# Patient Record
Sex: FEMALE | Race: WHITE | NOT HISPANIC OR LATINO | Employment: OTHER | URBAN - METROPOLITAN AREA
[De-identification: names, ages, dates, MRNs, and addresses within clinical notes are randomized per-mention and may not be internally consistent; named-entity substitution may affect disease eponyms.]

---

## 2023-12-28 ENCOUNTER — HOSPITAL ENCOUNTER (EMERGENCY)
Facility: HOSPITAL | Age: 73
Discharge: HOME | End: 2023-12-28
Attending: STUDENT IN AN ORGANIZED HEALTH CARE EDUCATION/TRAINING PROGRAM
Payer: MEDICARE

## 2023-12-28 VITALS
DIASTOLIC BLOOD PRESSURE: 74 MMHG | TEMPERATURE: 96.8 F | HEIGHT: 64 IN | BODY MASS INDEX: 26.12 KG/M2 | SYSTOLIC BLOOD PRESSURE: 138 MMHG | RESPIRATION RATE: 16 BRPM | WEIGHT: 153 LBS | HEART RATE: 70 BPM | OXYGEN SATURATION: 98 %

## 2023-12-28 DIAGNOSIS — G51.0 BELL'S PALSY: Primary | ICD-10-CM

## 2023-12-28 LAB — GLUCOSE BLD MANUAL STRIP-MCNC: 194 MG/DL (ref 74–99)

## 2023-12-28 PROCEDURE — 82947 ASSAY GLUCOSE BLOOD QUANT: CPT

## 2023-12-28 PROCEDURE — 86618 LYME DISEASE ANTIBODY: CPT | Performed by: STUDENT IN AN ORGANIZED HEALTH CARE EDUCATION/TRAINING PROGRAM

## 2023-12-28 PROCEDURE — 36415 COLL VENOUS BLD VENIPUNCTURE: CPT | Performed by: STUDENT IN AN ORGANIZED HEALTH CARE EDUCATION/TRAINING PROGRAM

## 2023-12-28 PROCEDURE — 99283 EMERGENCY DEPT VISIT LOW MDM: CPT | Performed by: STUDENT IN AN ORGANIZED HEALTH CARE EDUCATION/TRAINING PROGRAM

## 2023-12-28 RX ORDER — PREDNISONE 20 MG/1
60 TABLET ORAL DAILY
Qty: 15 TABLET | Refills: 0 | Status: SHIPPED | OUTPATIENT
Start: 2023-12-28 | End: 2024-01-02

## 2023-12-28 ASSESSMENT — COLUMBIA-SUICIDE SEVERITY RATING SCALE - C-SSRS
6. HAVE YOU EVER DONE ANYTHING, STARTED TO DO ANYTHING, OR PREPARED TO DO ANYTHING TO END YOUR LIFE?: NO
1. IN THE PAST MONTH, HAVE YOU WISHED YOU WERE DEAD OR WISHED YOU COULD GO TO SLEEP AND NOT WAKE UP?: NO
2. HAVE YOU ACTUALLY HAD ANY THOUGHTS OF KILLING YOURSELF?: NO

## 2023-12-28 ASSESSMENT — PAIN SCALES - GENERAL
PAINLEVEL_OUTOF10: 0 - NO PAIN
PAINLEVEL_OUTOF10: 0 - NO PAIN

## 2023-12-28 ASSESSMENT — PAIN - FUNCTIONAL ASSESSMENT: PAIN_FUNCTIONAL_ASSESSMENT: 0-10

## 2023-12-29 NOTE — DISCHARGE INSTRUCTIONS
As we discussed in the emergency department, we think your symptoms are secondary to Bell's palsy.  We will start you on steroids for this.  You do not need antiviral medication.  We will give you artificial tears for your eye.  While you are away, please put these artificial tears in your eye every 3-6 hours as needed.  When you go to sleep, we would suggest taping your eyelid shut.  You are given information to follow-up with neurology.  Please return to the emergency department if you have any other further concerns.

## 2023-12-29 NOTE — ED PROVIDER NOTES
HPI   Chief Complaint   Patient presents with    Facial Droop     72 Y/O FEMALE STATES THAT SHE NOTICED SHE WAS HAVING DIFFICULTY DRINKING LAST NIGHT AND THIS MORNING SHE NOTICED LEFT SIDED FACILA DROOP WHILE BRUSHING HER TEETH. NO OTHER NEURO DEFICITS NOTED IN TRIAGE. PATIENT STATES SHE HAS HISTORY OF BELLS PALSY.  KORYALERT CALLED AND THEN CANCELLED BY MD.        Reported history of Bell's palsy presents with left-sided facial droop noted this morning.  Patient states she was noticing difficulty drinking last evening; it sounds though she was having difficulty keeping fluid in her mouth.  States that she noticed a facial droop this morning and the mirror.  She notes associated dry sensation of the left eye.  When I asked her about her taste, she does note that her tongue feels numb, particularly to the front, left aspect of her tongue.  Denies falls and head trauma.  Denies headache, vision changes, neck pain, blurry vision, difficulty talking, difficulty swallowing, and focal numbness/weakness.  Does [place], but has not recently been in the woods has not noted any rashes on her skin.  She denies a history of CVA.      History provided by:  Patient   used: No                        [place] Coma Scale Score: 15         NIH Stroke Scale: 1          Patient History   No past medical history on file.  No past surgical history on file.  No family history on file.  Social History     Tobacco Use    Smoking status: Not on file    Smokeless tobacco: Not on file   Substance Use Topics    Alcohol use: Not on file    Drug use: Not on file       Physical Exam   ED Triage Vitals [12/28/23 1944]   Temp Heart Rate Resp BP   36 °C (96.8 °F) 75 16 155/72      SpO2 Temp Source Heart Rate Source Patient Position   99 % Temporal Monitor Sitting      BP Location FiO2 (%)     Right arm --       Physical Exam  Vitals and nursing note reviewed.   HENT:      Head: Atraumatic.       Right Ear: Tympanic membrane, ear canal and external ear normal.      Left Ear: Tympanic membrane, ear canal and external ear normal.      Ears:      Comments: No vesicular lesions in the ear.     Mouth/Throat:      Mouth: Mucous membranes are moist.   Eyes:      Extraocular Movements: Extraocular movements intact.      Conjunctiva/sclera: Conjunctivae normal.      Pupils: Pupils are equal, round, and reactive to light.   Cardiovascular:      Rate and Rhythm: Normal rate and regular rhythm.   Pulmonary:      Effort: Pulmonary effort is normal.   Abdominal:      Palpations: Abdomen is soft.      Tenderness: There is no abdominal tenderness.   Musculoskeletal:         General: No deformity.      Cervical back: Normal range of motion.   Skin:     General: Skin is warm and dry.   Neurological:      Mental Status: She is alert.      Comments: There is a mild left-sided facial droop is more apparent when the patient smiles.  There is very mild involvement of the left forehead.  There is very incomplete closure of the left eye, but patient can easily close her left eye with additional effort.  She is mentating appropriately.  Cranial nerves II through XII are otherwise grossly intact.  She has no truncal ataxia.  No limb ataxia finger-nose-finger or heel-to-shin.  Sensation intact to light touch in all extremities.  Ambulates without assistance.         ED Course & MDM   Diagnoses as of 12/29/23 0034   Bell's palsy       Medical Decision Making  Patient has a history of hypertension diet-controlled diabetes with a distant history of Bell's palsy.  Per chart review, she has a history of HOCM, hypertension, type 2 diabetes, CKD stage III, parkinsonism, type 2 diabetes, GERD, cocaine use, depression, and right eye drooping status post MRI brain 7/16/2023 and was without acute findings.  Her clinical picture is consistent with a moderate dysfunction Bell's palsy.  Stroke alert was initially called from triage; after my initial  evaluation, this was canceled, particularly given her clinical picture suggestive is Bell's palsy as well as her symptoms being present for greater than 24 hours.  Given her past medical history via chart review, she does have CVA risk factors and we discussed how this is being considered, but seems less likely than Bell's palsy.  Shared decision making to send a Lyme titer, given [place], patient does not want any further workup in the emergency department this time, which think is very reasonable, given her current clinical picture.  Shared decision making to trial a course of steroids and artificial tears.  We did discuss eye care.  Shared decision making not to trial antivirals at this time.  She was given a neurology referral for follow-up.  Discussed return precautions.    Amount and/or Complexity of Data Reviewed  Labs: ordered.    Risk  Prescription drug management.        Procedure  Procedures     Eligio Farley MD  12/29/23 0034

## 2024-01-01 LAB — B BURGDOR.VLSE1+PEPC10 AB SER IA-ACNC: 0.31 IV

## 2024-05-15 ENCOUNTER — OFFICE VISIT (OUTPATIENT)
Dept: NEUROLOGY | Facility: HOSPITAL | Age: 74
End: 2024-05-15
Payer: MEDICARE

## 2024-05-15 VITALS
HEIGHT: 65 IN | HEART RATE: 85 BPM | RESPIRATION RATE: 18 BRPM | DIASTOLIC BLOOD PRESSURE: 82 MMHG | BODY MASS INDEX: 25.49 KG/M2 | SYSTOLIC BLOOD PRESSURE: 145 MMHG | WEIGHT: 153 LBS | TEMPERATURE: 95.7 F

## 2024-05-15 DIAGNOSIS — G25.0 ESSENTIAL TREMOR: Primary | ICD-10-CM

## 2024-05-15 DIAGNOSIS — G51.0 BELL'S PALSY: ICD-10-CM

## 2024-05-15 PROCEDURE — 1160F RVW MEDS BY RX/DR IN RCRD: CPT

## 2024-05-15 PROCEDURE — 1159F MED LIST DOCD IN RCRD: CPT

## 2024-05-15 PROCEDURE — 99205 OFFICE O/P NEW HI 60 MIN: CPT

## 2024-05-15 PROCEDURE — 99215 OFFICE O/P EST HI 40 MIN: CPT | Mod: GC

## 2024-05-15 PROCEDURE — 1126F AMNT PAIN NOTED NONE PRSNT: CPT

## 2024-05-15 RX ORDER — SIMVASTATIN 20 MG/1
20 TABLET, FILM COATED ORAL NIGHTLY
COMMUNITY

## 2024-05-15 RX ORDER — AMLODIPINE BESYLATE 10 MG/1
10 TABLET ORAL DAILY
COMMUNITY

## 2024-05-15 RX ORDER — ATOMOXETINE 40 MG/1
40 CAPSULE ORAL DAILY
COMMUNITY

## 2024-05-15 RX ORDER — METFORMIN HYDROCHLORIDE 500 MG/1
500 TABLET ORAL
COMMUNITY

## 2024-05-15 RX ORDER — TROSPIUM CHLORIDE 20 MG/1
20 TABLET, FILM COATED ORAL DAILY
COMMUNITY

## 2024-05-15 RX ORDER — HYDROXYZINE HYDROCHLORIDE 10 MG/1
10 TABLET, FILM COATED ORAL ONCE
COMMUNITY

## 2024-05-15 RX ORDER — LOSARTAN POTASSIUM 50 MG/1
100 TABLET ORAL DAILY
COMMUNITY

## 2024-05-15 RX ORDER — METOPROLOL TARTRATE 50 MG/1
50 TABLET ORAL 2 TIMES DAILY
COMMUNITY

## 2024-05-15 ASSESSMENT — PAIN SCALES - GENERAL: PAINLEVEL: 0-NO PAIN

## 2024-05-15 NOTE — PROGRESS NOTES
Subjective     Gabby Cruz is a 73 y.o. year old female with history of HTN, ADD, sleep apnea on CPAP, diabetes, GERD, hypertension, past cocaine use (sober for 40 years per patient)   Presenting with the followin) [place] palsy for the 3rd time  2) Tremors  3) Head  bobbing    HPI:   [place] Palsy:   - First had palsy 2.5 years ago on the right side - took a few weeks to clear up, has another episode of right sided bells palsy a years ago. Not been positive for HSV or lyme in the past.   - A few months ago had bells palsy on the left side - lasted only a few weeks. Said she had some vision abnormalities during her first episode.     Tremors: Left hand is the worst- notices it when trying to do things with that hand and uses right hand to stabilize herself. Notices that its worse in the morning when she wakes up and in the evening when she is tired. Has had it for 7-8 years.   Gets occasional  head bobbing about once a month - has almost always been in the evening when she is tired.   Father and maternal grandmother also had tremors. Father was diagnosed with essential tremors.    Denies any numbness, weakness, has noticed new bladder incontinence in the last 3 months (describes urinary urgency - is started on medications). Does not report acting out dreams.     No family hx of neurological disorders - MS or parkinson's.     Review of Systems    PMH:  GERD  Diabetic  Heart murmur  ADD    PSH:  Bladder sling 23 yo    Current Outpatient Medications   Medication Instructions    amLODIPine (NORVASC) 10 mg, oral, Daily    atomoxetine (STRATTERA) 40 mg, oral, Daily, Swallow capsule whole; do not open. If opened accidentally, do not touch eyes; wash hands immediately (product is an eye irritant).    empagliflozin (JARDIANCE ORAL) 40 mg, oral, Daily    hydrOXYzine HCL (ATARAX) 10 mg, oral, Once    losartan (COZAAR) 100 mg, oral, Daily    lubricating eye drops ophthalmic solution 1 drop, Left Eye, 3 times daily PRN  "   metFORMIN (GLUCOPHAGE) 500 mg, oral, 2 times daily (morning and late afternoon)    metoprolol tartrate (LOPRESSOR) 50 mg, oral, 2 times daily    simvastatin (ZOCOR) 20 mg, oral, Nightly    trospium (SANCTURA) 20 mg, oral, Daily    -     Social History     Tobacco Use    Smoking status: Never    Smokeless tobacco: Never   Substance Use Topics    Alcohol use: Not on file     family history is not on file.    Allergies   Allergen Reactions    Erythromycin Rash    Penicillins Rash       Objective   Visit Vitals  /82   Pulse 85   Temp 35.4 °C (95.7 °F)   Resp 18   Ht 1.638 m (5' 4.5\")   Wt 69.4 kg (153 lb)   LMP  (LMP Unknown)   BMI 25.86 kg/m²   OB Status Postmenopausal   Smoking Status Never   BSA 1.78 m²      Neurological Exam  Physical Exam  MENTAL STATE:  Orientation was normal to time, place and person. Recent and remote memory was intact.  Attention span and concentration were normal. Language testing was normal for comprehension, repetition, expression, and naming.     CRANIAL NERVES:  Cranial nerves were normal.      CN 2-  visual fields full to confrontation.      CN 3, 4, 6-  Pupils round, 4 mm in diameter, equally reactive to light. No ptosis. EOMs normal alignment, full range of movement, no nystagmus, no SAUNDRA     CN 5- Facial sensation intact bilaterally. Normal corneal reflexes.      CN 7- Normal and symmetric facial strength. Nasolabial folds symmetric.     CN 8- Hearing intact to finger rub, whisper.      CN 9- Palate elevates symmetrically.      CN 11- Normal strength of shoulder shrug and neck turning      CN 12- Tongue midline, with normal bulk and strength; no fasciculations.     MOTOR:  Motor exam was normal. Muscle bulk and tone were normal in both upper and lower extremities. Muscle strength was 5/5 in distal and proximal muscles in both upper and lower extremities. No fasciculations, tremor or other abnormal movements were present.   Some slowness in L and R finger tapping    REFLEXES:  " Right/ Left:  Biceps 3/3, brachioradialis 2/2, triceps 3/3 patellar 3/3, ankle 2/2    SENSORY: Sensory exam was intact to light touch,    COORDINATION: TERRY were intact in upper and lower extremities.  In UE- finger-nose-finger was intact and in LE- heel-to-shin was intact without dysmetria or overshoot.    Low amplitude, high frequency tremor notable in both arms L>R    GAIT: Station was stable with a normal base and negative Romberg sign. Some reduced R. Arm arm swing speed . Some instability with tandem gait.        IMAGING:   DATE OF EXAM: Jul 27 2023 10:03AM      [facility]   [id number]  -  MRI BRAIN WO/W IVCON  / ACCESSION #  [id number]     PROCEDURE REASON: multiple diagnoses          * * * * Physician Interpretation * * * *      EXAMINATION:  MRI BRAIN WO/W IVCON     HISTORY:  Stroke-like symptoms - Transient cerebral ischemia, unspecified   type     TECHNIQUE: MRI brain routine protocol without and with contrast.   M: [id number]     MR Contrast:  Dotarem   Contrast Dose:  13 cc   Route of Administration:  IV     COMPARISON:  CT brain 10/01/2020     RESULT:     Acute Change:  No evidence of restricted diffusion to suggest an acute   infarct.     Hemorrhage: No evidence of prior parenchymal hemorrhage on the   susceptibility weighted sequences.     Mass Lesion/ Mass Effect:  No evidence of an intracranial mass, abnormal   enhancement, extra-axial fluid collection, or significant localized mass   effect.     Chronic Change:  Small/patchy and early confluent nonspecific foci of   T2/FLAIR hyperintensity scattered throughout the supratentorial white   matter likely reflecting chronic microvascular ischemia.     Parenchyma:  No significant parenchymal volume loss for age.     Ventricles:  Normal caliber and morphology.     Skull Base:  Hypothalamic and pituitary region are grossly normal.   Craniocervical junction is normal. No significant marrow replacement   process.     Vasculature: Major intracranial arteries and dural  venous sinuses   demonstrate typical flow voids, suggesting patency by spin echo criteria.     Other:  The paranasal sinuses and mastoid air cells are clear.  The   orbits and extracranial soft tissues are unremarkable.     Assessment/Plan   72yo F with  history of HTN, ADD, sleep apnea on CPAP, diabetes, GERD, hypertension, past cocaine use (sober for 40 years per patient).   #Essential tremor  Exam significant for a low amplitude high frequency tremor c/f essential tremor. Also noted were reduced arm swing speed on right side with slowness in finger tapping. Will continue to monitor tremor for progression. Will treat as essential tremor at this time as most likely diagnosis given exam and family history.  #Recurrent [place] palsy  - 3 episodes in last 3 years - involving right and left side, resolving afte a few weeks. no residual deficits on exam. Lyme titers negative. Will review MRI Brain w.wo contrast from [facility] for demyelinating/inflammatory etiologies. Report is non concerning.     Plan:   - Review [facility] MRI Brain w.wo contrast  - Discuss with PCP about switching home metoprolol to propranolol to help control tremors  - Will order OT for weighted utensils.   - RTC in 3mo    Katrin Bosch MD  Neurology PGY-2    Patient seen and evaluated by DR. Corona    Addendum 5/16:   Had a telephone conversation with patients PCP Dr. Martinez at [facility] who agreed with switching metoprolol to propranolol. Patient called and informed about change.     Katrin Bosch

## 2024-05-15 NOTE — PATIENT INSTRUCTIONS
Dear Ms. Cruz,     Thank you for your visit today. You came in for recurrent bells palsy as well as tremor in your arms.   For your bells palsy - we would like to review your brain imaging from [facility] -we will call you when the imaging is in    For your tremors, we would like to change your metoprolol medication (for your heart) to propranolol to help with the tremors (we will discuss with your cardiologist to confirm if we can do this change). We also order occupational therapy to help you get weighted utensils that will also aid in carrying out your daily activities.     Best regards,    Neurology

## 2024-05-15 NOTE — Clinical Note
May 15, 2024     Eligio Farley MD  [address]  [address]  [place] [address]    Patient: Gabby Cruz   YOB: 1950   Date of Visit: 5/15/2024       Dear Dr. Eligio Farley MD:    Thank you for referring Gabby Cruz to me for evaluation. Below are my notes for this consultation.  If you have questions, please do not hesitate to call me. I look forward to following your patient along with you.       Sincerely,     Katrin Bosch MD      CC: No Recipients  ______________________________________________________________________________________    Subjective    Gabby Cruz is a 73 y.o. year old female with history of ADD, diabetes, GERD, hypertension, depression, narcolepsy, obesity, past cocaine use (sober for 40 years per patient)     HPI    Review of Systems    PMH:    PSH:  Social History     Tobacco Use    Smoking status: Not on file    Smokeless tobacco: Not on file   Substance Use Topics    Alcohol use: Not on file     family history is not on file.    Current Outpatient Medications:     lubricating eye drops ophthalmic solution, Administer 1 drop into the left eye 3 times a day as needed for dry eyes (3 times per day while awake)., Disp: 1 each, Rfl: 0  Allergies   Allergen Reactions    Erythromycin Rash    Penicillins Rash       Objective  Neurological Exam  Physical Exam  GENERAL APPEARANCE:  No distress, alert and cooperative.     CARDIOVASCULAR: Regular, rate and rhythm, without murmur. No carotid bruits. Pulses +2 and equal in all extremities. No edema, or tenderness to palpation.    MENTAL STATE:  Orientation was normal to time, place and person. Recent and remote memory was intact.  Attention span and concentration were normal. Language testing was normal for comprehension, repetition, expression, and naming. Calculation was intact. The patient could correctly interpret a picture, and copy a diagram. General fund of knowledge was intact. Mini-mental status examination  was performed with no errors.     OPHTHALMOSCOPIC: The ophthalmoscopic exam normal. The fundi were well visualized with normal disc margins, clear vessels and vascular pulsations. No disc edema. The cup/disk ratio was not enlarged. No hemorrhages or exudates were present in the posterior segments that were visualized.     CRANIAL NERVES:  Cranial nerves were normal.      CN 2- Visual Acuity  OD: 20/20 (corrected)   OS: 20/20 (corrected); visual fields full to confrontation.      CN 3, 4, 6-  Pupils round, 4 mm in diameter, equally reactive to light. No ptosis. EOMs normal alignment, full range of movement, no nystagmus     CN 5- Facial sensation intact bilaterally. Normal corneal reflexes.      CN 7- Normal and symmetric facial strength. Nasolabial folds symmetric.     CN 8- Hearing intact to finger rub, whisper.      CN 9- Palate elevates symmetrically. Normal gag reflex.      CN 11- Normal strength of shoulder shrug and neck turning      CN 12- Tongue midline, with normal bulk and strength; no fasciculations.     MOTOR:  Motor exam was normal. Muscle bulk and tone were normal in both upper and lower extremities. Muscle strength was 5/5 in distal and proximal muscles in both upper and lower extremities. No fasciculations, tremor or other abnormal movements were present.     REFLEXES:  Right/ Left:  Biceps 2/2, brachioradialis 2/2, triceps 2/2, patellar 2/2, ankle 2/2  Babinski: toes downgoing to plantar stimulation. No clonus, frontal release signs or other pathologic reflexes present.     SENSORY: Sensory exam was intact to light touch, sharp/dull, vibration and position sense in both UE and LE.     COORDINATION: TERRY were intact in upper and lower extremities.  In UE- finger-nose-finger was intact and in LE- heel-to-shin was intact without dysmetria or overshoot.      GAIT: Station was stable with a normal base and negative Romberg sign. Gait was stable with a normal arm swing and speed. No ataxia, shuffling,  steppage or waddling was noted. Tandem gait was intact. Postural reflexes were normal.     Assessment/Plan  {Assess/PlanSmartLinks:92019}

## 2024-05-16 RX ORDER — PROPRANOLOL HYDROCHLORIDE 40 MG/1
40 TABLET ORAL 2 TIMES DAILY
Qty: 60 TABLET | Refills: 11 | Status: SHIPPED | OUTPATIENT
Start: 2024-05-16 | End: 2025-05-16

## 2024-05-23 ENCOUNTER — EVALUATION (OUTPATIENT)
Dept: OCCUPATIONAL THERAPY | Facility: CLINIC | Age: 74
End: 2024-05-23
Payer: MEDICARE

## 2024-05-23 DIAGNOSIS — G25.0 ESSENTIAL TREMOR: ICD-10-CM

## 2024-05-23 PROCEDURE — 97165 OT EVAL LOW COMPLEX 30 MIN: CPT | Mod: GO

## 2024-05-23 PROCEDURE — 97110 THERAPEUTIC EXERCISES: CPT | Mod: GO

## 2024-05-23 ASSESSMENT — PATIENT HEALTH QUESTIONNAIRE - PHQ9
10. IF YOU CHECKED OFF ANY PROBLEMS, HOW DIFFICULT HAVE THESE PROBLEMS MADE IT FOR YOU TO DO YOUR WORK, TAKE CARE OF THINGS AT HOME, OR GET ALONG WITH OTHER PEOPLE: VERY DIFFICULT
1. LITTLE INTEREST OR PLEASURE IN DOING THINGS: SEVERAL DAYS
2. FEELING DOWN, DEPRESSED OR HOPELESS: SEVERAL DAYS
SUM OF ALL RESPONSES TO PHQ9 QUESTIONS 1 AND 2: 2

## 2024-05-23 ASSESSMENT — PAIN - FUNCTIONAL ASSESSMENT: PAIN_FUNCTIONAL_ASSESSMENT: 0-10

## 2024-05-23 ASSESSMENT — PAIN SCALES - GENERAL: PAINLEVEL_OUTOF10: 0 - NO PAIN

## 2024-09-11 ENCOUNTER — OFFICE VISIT (OUTPATIENT)
Dept: NEUROLOGY | Facility: HOSPITAL | Age: 74
End: 2024-09-11
Payer: MEDICARE

## 2024-09-11 VITALS
TEMPERATURE: 97.7 F | HEIGHT: 64 IN | DIASTOLIC BLOOD PRESSURE: 98 MMHG | RESPIRATION RATE: 17 BRPM | SYSTOLIC BLOOD PRESSURE: 147 MMHG | WEIGHT: 149.91 LBS | BODY MASS INDEX: 25.59 KG/M2 | HEART RATE: 85 BPM

## 2024-09-11 DIAGNOSIS — G25.0 ESSENTIAL TREMOR: Primary | ICD-10-CM

## 2024-09-11 DIAGNOSIS — G51.0 BELL'S PALSY: ICD-10-CM

## 2024-09-11 PROCEDURE — 99214 OFFICE O/P EST MOD 30 MIN: CPT | Performed by: STUDENT IN AN ORGANIZED HEALTH CARE EDUCATION/TRAINING PROGRAM

## 2024-09-11 PROCEDURE — 1126F AMNT PAIN NOTED NONE PRSNT: CPT | Performed by: STUDENT IN AN ORGANIZED HEALTH CARE EDUCATION/TRAINING PROGRAM

## 2024-09-11 PROCEDURE — 1159F MED LIST DOCD IN RCRD: CPT | Performed by: STUDENT IN AN ORGANIZED HEALTH CARE EDUCATION/TRAINING PROGRAM

## 2024-09-11 PROCEDURE — 99214 OFFICE O/P EST MOD 30 MIN: CPT | Mod: GC | Performed by: STUDENT IN AN ORGANIZED HEALTH CARE EDUCATION/TRAINING PROGRAM

## 2024-09-11 PROCEDURE — 3008F BODY MASS INDEX DOCD: CPT | Performed by: STUDENT IN AN ORGANIZED HEALTH CARE EDUCATION/TRAINING PROGRAM

## 2024-09-11 ASSESSMENT — PAIN SCALES - GENERAL: PAINLEVEL: 0-NO PAIN

## 2024-09-11 NOTE — PROGRESS NOTES
Subjective     Gabby Cruz is a 73 y.o. year old female here for follow up for tremors and repeated episodes of [place] palsy.     Last seen in May, 2014,   At that time, we started Ms. Cruz on propranolol from metoprolol for her essential tremors.   Patient also came for repeated evaluations for bells palsy, we had requested imaging from [facility] to review the contrasted images. Report from [facility] reports no enhancement.     Interval History:   Patient reports a 80% improvement in her tremors and head bobbing. She mainly notices the tremors first thing in the morning before she takes any pils. No side effects from medications. Is currently taking 60mg propranolol BID and is going to go to cardiology appointment for adjustment of her dosage.     Reports occasional constipation, some memory changes, no smell changes. No trouble walking or falls. Says about a year ago she has been Walking more slowly.       Review of Systems    There is no problem list on file for this patient.    No past medical history on file.  No past surgical history on file.  Social History     Tobacco Use    Smoking status: Never    Smokeless tobacco: Never   Substance Use Topics    Alcohol use: Not on file     family history is not on file.    Current Outpatient Medications:     amLODIPine (Norvasc) 10 mg tablet, Take 1 tablet (10 mg) by mouth once daily., Disp: , Rfl:     atomoxetine (Strattera) 40 mg capsule, Take 1 capsule (40 mg) by mouth once daily. Swallow capsule whole; do not open. If opened accidentally, do not touch eyes; wash hands immediately (product is an eye irritant)., Disp: , Rfl:     hydrOXYzine HCL (Atarax) 10 mg tablet, Take 1 tablet (10 mg) by mouth 1 time., Disp: , Rfl:     metFORMIN (Glucophage) 500 mg tablet, Take 1 tablet (500 mg) by mouth 2 times daily (morning and late afternoon)., Disp: , Rfl:     propranolol (Inderal) 40 mg tablet, Take 1 tablet (40 mg) by mouth 2 times a day., Disp: 60 tablet, Rfl: 11     simvastatin (Zocor) 20 mg tablet, Take 1 tablet (20 mg) by mouth once daily at bedtime., Disp: , Rfl:     empagliflozin (JARDIANCE ORAL), Take 40 mg by mouth once daily., Disp: , Rfl:     losartan (Cozaar) 50 mg tablet, Take 2 tablets (100 mg) by mouth once daily., Disp: , Rfl:     lubricating eye drops ophthalmic solution, Administer 1 drop into the left eye 3 times a day as needed for dry eyes (3 times per day while awake). (Patient not taking: Reported on 9/11/2024), Disp: 1 each, Rfl: 0    metoprolol tartrate (Lopressor) 50 mg tablet, Take 1 tablet by mouth 2 times a day., Disp: , Rfl:     trospium (Sanctura) 20 mg tablet, Take 1 tablet (20 mg) by mouth once daily., Disp: , Rfl:   Allergies   Allergen Reactions    Erythromycin Rash    Penicillins Rash       Objective   Neurological Exam  Physical Exam  GENERAL APPEARANCE:  No distress, alert and cooperative.     MENTAL STATE:  Orientation was normal to time, place and person. Attention span and concentration were normal. L    CRANIAL NERVES:  Cranial nerves were normal.      CN 2- visual fields full to confrontation.      CN 3, 4, 6-  Pupils round, 4 mm in diameter, equally reactive to light. No ptosis. EOMs normal alignment, full range of movement, no nystagmus     CN 5- Facial sensation intact bilaterally.      CN 7- Normal and symmetric facial strength. Nasolabial folds symmetric.     CN 8- Hearing intact to finger rub, whisper.      CN 9- Palate elevates symmetrically.      CN 11- Normal strength of shoulder shrug.    MOTOR:  Muscle bulk and tone were normal in both upper and lower extremities. Muscle strength was 5/5 in distal and proximal muscles in both upper and lower extremities.  Mild action and postural tremor present on left arm. No head bobbing observed on this visit.     REFLEXES:  Right/ Left:  Biceps 2/2, brachioradialis 2/2, triceps 2/2, patellar 2/2, ankle 2/2    SENSORY: Sensory exam was intact to light touch,in both UE and LE.      COORDINATION: TERRY were intact in upper and lower extremities.  In UE- finger-nose-finger was intact and in LE- heel-to-shin was intact without dysmetria or overshoot.      GAIT: Station was stable with a normal base and negative Romberg sign. Some reduced arm swing. No enbloc turning or FOG.  No ataxia, shuffling, steppage or waddling was noted.  Postural reflexes were normal.     Assessment/Plan   Diagnoses and all orders for this visit:  Essential tremor  -     Follow Up In Neurology; Future  Bell's palsy  Gabby Cruz is a 73 y.o. year old female here for follow up for tremors and repeated episodes of [place] palsy. Essential tremor has improved significantly after initiation of propranolol. Wanted to review MRI brain w/wp contrast from [facility] but images still not uploaded. No residual bells palsy symptoms. On Neuro exam., patient has some residual action and postural tremor on left arm. Some slowing of gait and reduced arm swing also observed. Will continue to monitor patients symptoms to monitor for development of any parkinsonian features.     Plan:   - Will review [facility] MRI imaging  - C/w propranolol for ET  -RTC in 1 year    Katrin Bosch MD  Neurology PGY-3    Patient seen and evaluated by Dr. Chery

## 2024-09-11 NOTE — PATIENT INSTRUCTIONS
Pawel Cruz,     Thank you for your visit today.   Please continue taking your propranolol for your tremors. Please let us know if tremors worsen after your dosage is changed by Cardiology.   We will also review your [facility] MRI imaging and give you a call if there is any additional changes required.     Wishing you well.   Regrads,    Neurology

## 2024-11-01 ENCOUNTER — APPOINTMENT (OUTPATIENT)
Dept: RADIOLOGY | Facility: CLINIC | Age: 74
End: 2024-11-01
Payer: MEDICARE

## 2024-11-27 ENCOUNTER — HOSPITAL ENCOUNTER (OUTPATIENT)
Dept: RADIOLOGY | Facility: HOSPITAL | Age: 74
Discharge: HOME | End: 2024-11-27
Payer: MEDICARE

## 2024-11-27 VITALS — BODY MASS INDEX: 25.59 KG/M2 | WEIGHT: 149.91 LBS | HEIGHT: 64 IN

## 2024-11-27 DIAGNOSIS — I42.1 OBSTRUCTIVE HYPERTROPHIC CARDIOMYOPATHY (MULTI): ICD-10-CM

## 2024-11-27 PROCEDURE — 2550000001 HC RX 255 CONTRASTS: Performed by: RADIOLOGY

## 2024-11-27 PROCEDURE — A9575 INJ GADOTERATE MEGLUMI 0.1ML: HCPCS | Performed by: RADIOLOGY

## 2024-11-27 PROCEDURE — 75561 CARDIAC MRI FOR MORPH W/DYE: CPT

## 2024-11-27 PROCEDURE — 75561 CARDIAC MRI FOR MORPH W/DYE: CPT | Performed by: RADIOLOGY

## 2024-11-27 RX ORDER — GADOTERATE MEGLUMINE 376.9 MG/ML
28 INJECTION INTRAVENOUS
Status: COMPLETED | OUTPATIENT
Start: 2024-11-27 | End: 2024-11-27